# Patient Record
Sex: MALE
[De-identification: names, ages, dates, MRNs, and addresses within clinical notes are randomized per-mention and may not be internally consistent; named-entity substitution may affect disease eponyms.]

---

## 2019-05-10 PROBLEM — Z00.00 ENCOUNTER FOR PREVENTIVE HEALTH EXAMINATION: Status: ACTIVE | Noted: 2019-05-10

## 2019-05-22 ENCOUNTER — APPOINTMENT (OUTPATIENT)
Dept: ORTHOPEDIC SURGERY | Facility: CLINIC | Age: 74
End: 2019-05-22
Payer: MEDICARE

## 2019-05-22 VITALS — WEIGHT: 205 LBS | BODY MASS INDEX: 27.77 KG/M2 | HEIGHT: 72 IN

## 2019-05-22 PROCEDURE — 99204 OFFICE O/P NEW MOD 45 MIN: CPT

## 2019-05-22 PROCEDURE — 0232T NJX PLATELET PLASMA: CPT

## 2019-05-22 PROCEDURE — 73562 X-RAY EXAM OF KNEE 3: CPT | Mod: 50

## 2019-05-22 NOTE — HISTORY OF PRESENT ILLNESS
[Pain Location] : pain [] : right & left knee [6] : an average pain level of 6/10 [Heat] : relieved by heat [Ice] : relieved by ice [NSAIDs] : relieved by nonsteroidal anti-inflammatory drugs [Rest] : relieved by rest [de-identified] : TAINA KNEE PAIN 1 YEAR AGO- \par GOOD RELIEF FROM PRP INJECTIONS 6 MONTHS AGO \par \par PAIN LEVEL 6/10\par INTERMITTENT PAIN\par WORSE WITH DOWNSTAIRS\par BETTER PRP, ICE HEAT, STRETCHING\par SWELLING\par WEAKNESS [de-identified] : DOWNSTAIRS

## 2019-05-22 NOTE — PROCEDURE
[de-identified] : PLATELET RICH PLASMA INJECTION  ELBOW\par \par 60CC BLOOD STERILELY DRAWN FROM FOREARM.\par 10CC PLATELET RICH PLASMA PREPARED USING HARVEST 60CC KIT RECOMMENDED TECHNIQUE.,\par \par UTILIZING ULTRASOUND GUIDANCE, THE NEEDLE WAS STERILELY PLACED IN THE RIGHT AND LEFT KNEES VIA SUPEROLATERAL APPROACH.\par 6CC CLEAR YELLOW FLUID ASPIRATED LEFT KNEE.\par 5 CC PRP INJECTED EACH KNEE \par \par COLD PACK APPLIED AND RECOMMENDED 48 HOURS\par NO GYM EXERCISES 14 DAYS \par AVOID USE OF ORAL NSAIDS FOR 14 DAYS\par TYLENOL OK\par \par

## 2019-05-22 NOTE — DISCUSSION/SUMMARY
[de-identified] : COLD PACK APPLIED AND RECOMMENDED 48 HOURS\par NO GYM EXERCISES 14 DAYS \par AVOID USE OF ORAL NSAIDS FOR 14 DAYS\par TYLENOL OK\par \par

## 2019-05-22 NOTE — PHYSICAL EXAM
[de-identified] : PHYSICAL EXAM BILATERAL KNEES\par \par AROM\par RIGHT  0- 130\par LEFT    0-130 \par \par SPECIAL TESTS\par \par PATELLAR GRIND = GRIND \par DRAWER  = NEG\par LACHMAN = NEG\par MACMURRAY = NEG \par \par MOTOR = GROSSLY INTACT\par SENSORY = GROSSLY INTACT \par \par \par  [de-identified] : XRAY LEFT KNEE:\par 4 views of the knee\par MODERATE - SEVERE OA MEDIAL COMPARTMENT \par No obvious fracture or dislocation. \par Alignment within normal limits\par \par \par XRAY RIGHT KNEE:\par 4 views of the knee \par MODERATE OA MEDIAL COMPARTMENT \par No obvious fracture or dislocation. \par Alignment within normal limits\par \par \par

## 2019-10-11 ENCOUNTER — APPOINTMENT (OUTPATIENT)
Dept: ORTHOPEDIC SURGERY | Facility: CLINIC | Age: 74
End: 2019-10-11
Payer: MEDICARE

## 2019-10-11 VITALS — HEIGHT: 72 IN | WEIGHT: 205 LBS | BODY MASS INDEX: 27.77 KG/M2

## 2019-10-11 PROCEDURE — 99214 OFFICE O/P EST MOD 30 MIN: CPT | Mod: 25

## 2019-10-11 PROCEDURE — 0232T NJX PLATELET PLASMA: CPT

## 2019-10-11 NOTE — PROCEDURE
[de-identified] : PLATELET RICH PLASMA INJECTION RIGHT AND LEFT  KNEE\par \par 120 CC BLOOD STERILELY DRAWN FROM FOREARM.\par 5CC PLATELET RICH PLASMA PREPARED USING [employer] SYSTEM - RECOMMENDED TECHNIQUE\par \par UTILIZING ULTRASOUND GUIDANCE, THE NEEDLE WAS STERILELY PLACED IN THE KNEE JOINT VIA SUPEROLATERAL APPROACH, AND 5CC PRP INJECTED SLOWLY.\par \par COLD PACK APPLIED AND RECOMMENDED 48 HOURS\par NO GYM EXERCISES 14 DAYS \par AVOID USE OF ORAL NSAIDS FOR 14 DAYS\par TYLENOL OK\par

## 2019-10-11 NOTE — HISTORY OF PRESENT ILLNESS
[de-identified] : TAINA KNEE PAIN 1 YEAR AGO- \par GOOD RELIEF FROM PRP INJECTIONS 6 MONTHS AGO \par PRP MAY 22 2019- HELPED FOR 3 MONTHS\par \par PAIN LEVEL 5/10\par INTERMITTENT PAIN\par WORSE WITH DOWNSTAIRS\par BETTER PRP, ICE HEAT, STRETCHING\par SWELLING\par WEAKNESS

## 2019-10-11 NOTE — PHYSICAL EXAM
[de-identified] : PHYSICAL EXAM BILATERAL KNEES\par \par AROM\par RIGHT  0- 130\par LEFT    0-130 \par \par SPECIAL TESTS\par \par PATELLAR GRIND = GRIND \par DRAWER  = NEG\par LACHMAN = NEG\par MACMURRAY = NEG \par \par MOTOR = GROSSLY INTACT\par SENSORY = GROSSLY INTACT \par \par \par

## 2019-10-11 NOTE — DISCUSSION/SUMMARY
[de-identified] : POST INJECTION INSTRUCTIONS\par \par COLD THERAPY , ANALGESICS PRN\par \par HOME STRETCHING AND EXERCISES QD\par \par

## 2020-06-25 ENCOUNTER — APPOINTMENT (OUTPATIENT)
Dept: ORTHOPEDIC SURGERY | Facility: CLINIC | Age: 75
End: 2020-06-25
Payer: MEDICARE

## 2020-06-25 VITALS — TEMPERATURE: 97.2 F

## 2020-06-25 PROCEDURE — 0232T NJX PLATELET PLASMA: CPT

## 2020-06-25 PROCEDURE — 99213 OFFICE O/P EST LOW 20 MIN: CPT

## 2020-06-25 NOTE — HISTORY OF PRESENT ILLNESS
[de-identified] : BILATERAL KNEES \par RIGHT WORSE THEN LEFT\par PAIN LEVEL 5/10\par WORSE AFTER PLAYING TENNIS\par SWELLING\par BETTER WITH ICE\par PREVIOUS PRP VERY HELPFUL

## 2020-06-25 NOTE — PROCEDURE
[de-identified] : PLATELET RICH PLASMA INJECTION  RIGHT AND LEFT KNEE\par \par 120 CC BLOOD STERILELY DRAWN FROM FOREARM.\par 5CC PLATELET RICH PLASMA PREPARED USING [employer] SYSTEM - RECOMMENDED TECHNIQUE\par \par UTILIZING ULTRASOUND GUIDANCE, THE NEEDLE WAS STERILELY PLACED IN THE KNEE JOINT VIA SUPEROLATERAL APPROACH, AND 3CC PRP INJECTED SLOWLY.\par \par COLD PACK APPLIED AND RECOMMENDED 48 HOURS\par NO GYM EXERCISES 14 DAYS \par AVOID USE OF ORAL NSAIDS FOR 14 DAYS\par TYLENOL OK\par \par \par ASPIRATE EFFUSION -  5CC RIGHT KNEE

## 2020-11-12 ENCOUNTER — APPOINTMENT (OUTPATIENT)
Dept: ORTHOPEDIC SURGERY | Facility: CLINIC | Age: 75
End: 2020-11-12
Payer: SELF-PAY

## 2020-11-12 DIAGNOSIS — M25.461 EFFUSION, RIGHT KNEE: ICD-10-CM

## 2020-11-12 PROCEDURE — 99214 OFFICE O/P EST MOD 30 MIN: CPT

## 2020-11-12 PROCEDURE — 0232T NJX PLATELET PLASMA: CPT

## 2020-11-12 NOTE — HISTORY OF PRESENT ILLNESS
[de-identified] : BILATERAL KNEE PAIN\par FOLLOW UP\par RIGHT WORSE THEN LEFT\par FLARE UP 6 WEEKS AGO\par PAIN LEVEL 7-8/10 \par SWELLING\par WORSE AFTER WALKING\par ICE HELPS \par PREVIOUS PRP 6/25/20- HELPFUL FOR ABOUT 5 MONTHS

## 2020-11-12 NOTE — DISCUSSION/SUMMARY
[de-identified] : COLD PACK APPLIED AND RECOMMENDED 48 HOURS\par NO GYM EXERCISES 14 DAYS \par AVOID USE OF ORAL NSAIDS FOR 14 DAYS\par TYLENOL OK\par

## 2020-11-12 NOTE — PROCEDURE
[de-identified] : PLATELET RICH PLASMA INJECTION RIGHT AND LEFT KNEE\par \par 120 CC BLOOD STERILELY DRAWN FROM FOREARM.\par 4CC PLATELET RICH PLASMA EACH SIDE PREPARED USING [employer] SYSTEM - RECOMMENDED TECHNIQUE\par \par UTILIZING ULTRASOUND GUIDANCE, THE NEEDLE WAS STERILELY PLACED IN THE KNEE JOINT VIA SUPEROLATERAL APPROACH, AND 4CC PRP INJECTED SLOWLY.\par \par COLD PACK APPLIED AND RECOMMENDED 48 HOURS\par NO GYM EXERCISES 14 DAYS \par AVOID USE OF ORAL NSAIDS FOR 14 DAYS\par TYLENOL OK\par \par \par ASPIRATE EFFUSION - 4CC RIGHT KNEE.

## 2021-08-11 ENCOUNTER — APPOINTMENT (OUTPATIENT)
Dept: ORTHOPEDIC SURGERY | Facility: CLINIC | Age: 76
End: 2021-08-11
Payer: MEDICARE

## 2021-08-11 DIAGNOSIS — M17.12 UNILATERAL PRIMARY OSTEOARTHRITIS, LEFT KNEE: ICD-10-CM

## 2021-08-11 DIAGNOSIS — M17.11 UNILATERAL PRIMARY OSTEOARTHRITIS, RIGHT KNEE: ICD-10-CM

## 2021-08-11 PROCEDURE — 99214 OFFICE O/P EST MOD 30 MIN: CPT

## 2021-08-11 PROCEDURE — 0232T NJX PLATELET PLASMA: CPT

## 2021-08-11 NOTE — HISTORY OF PRESENT ILLNESS
[de-identified] : BILATERAL KNEE PAIN\par FOLLOW UP\par LEFT WORSE THAN RIGHT \par FLARE UP  JUNE 2021\par PAIN LEVEL: 6/10  \par SWELLING\par ICE HELPS \par PREVIOUS PRP 6/25/20- HELPFUL FOR ABOUT 5 MONTHS \par PREVIOUS PRP 11/12/2020- HELPFUL FOR ABOUT 5 MONTHS

## 2021-08-11 NOTE — DISCUSSION/SUMMARY
[de-identified] : \par PATIENT HAS ELECTED TO PROCEED WITH PRP INJECTIONS BOTH KNEES \par RISKS AND BENEFITS DISCUSSED - VERBAL CONSENT OBTAINED \par \par \par POST INJECTION INSTRUCTIONS\par \par COLD THERAPY , TYLENOL  PRN\par \par NO GYM SPORTS 7 DAYS \par \par REPEAT PRP 6 MONTHS PRN

## 2021-08-11 NOTE — PROCEDURE
[de-identified] : PLATELET RICH PLASMA INJECTION RIGHT AND LEFT KNEE\par \par 120 CC BLOOD STERILELY DRAWN FROM FOREARM.\par 4CC PLATELET RICH PLASMA EACH SIDE PREPARED USING [employer] SYSTEM - RECOMMENDED TECHNIQUE\par \par UTILIZING ULTRASOUND GUIDANCE, THE NEEDLE WAS STERILELY PLACED IN THE KNEE JOINT VIA SUPEROLATERAL APPROACH, AND 4CC PRP INJECTED SLOWLY.\par \par COLD PACK APPLIED AND RECOMMENDED 48 HOURS\par NO GYM EXERCISES 14 DAYS \par AVOID USE OF ORAL NSAIDS FOR 14 DAYS\par TYLENOL OK\par \par \par ASPIRATE EFFUSION - NONE  RIGHT KNEE.

## 2021-08-11 NOTE — PHYSICAL EXAM
[de-identified] : PHYSICAL EXAM BILATERAL KNEES\par \par AROM\par RIGHT  0- 130\par LEFT    0-130 \par \par SPECIAL TESTS\par \par PATELLAR GRIND = GRIND \par DRAWER  = NEG\par LACHMAN = NEG\par MACMURRAY = NEG \par \par MOTOR = GROSSLY INTACT\par SENSORY = GROSSLY INTACT \par \par \par